# Patient Record
Sex: FEMALE | Race: WHITE | NOT HISPANIC OR LATINO | URBAN - METROPOLITAN AREA
[De-identification: names, ages, dates, MRNs, and addresses within clinical notes are randomized per-mention and may not be internally consistent; named-entity substitution may affect disease eponyms.]

---

## 2019-08-09 ENCOUNTER — EMERGENCY (EMERGENCY)
Facility: HOSPITAL | Age: 24
LOS: 1 days | Discharge: HOME | End: 2019-08-09
Attending: EMERGENCY MEDICINE | Admitting: EMERGENCY MEDICINE
Payer: COMMERCIAL

## 2019-08-09 VITALS
HEART RATE: 83 BPM | DIASTOLIC BLOOD PRESSURE: 89 MMHG | TEMPERATURE: 99 F | OXYGEN SATURATION: 98 % | RESPIRATION RATE: 18 BRPM | WEIGHT: 247.14 LBS | SYSTOLIC BLOOD PRESSURE: 129 MMHG | HEIGHT: 66 IN

## 2019-08-09 LAB
ANION GAP SERPL CALC-SCNC: 14 MMOL/L — SIGNIFICANT CHANGE UP (ref 5–17)
BASOPHILS # BLD AUTO: 0.02 K/UL — SIGNIFICANT CHANGE UP (ref 0–0.2)
BASOPHILS NFR BLD AUTO: 0.3 % — SIGNIFICANT CHANGE UP (ref 0–2)
BUN SERPL-MCNC: 9 MG/DL — SIGNIFICANT CHANGE UP (ref 7–23)
CALCIUM SERPL-MCNC: 9.7 MG/DL — SIGNIFICANT CHANGE UP (ref 8.4–10.5)
CHLORIDE SERPL-SCNC: 106 MMOL/L — SIGNIFICANT CHANGE UP (ref 96–108)
CO2 SERPL-SCNC: 23 MMOL/L — SIGNIFICANT CHANGE UP (ref 22–31)
CREAT SERPL-MCNC: 0.66 MG/DL — SIGNIFICANT CHANGE UP (ref 0.5–1.3)
EOSINOPHIL # BLD AUTO: 0.02 K/UL — SIGNIFICANT CHANGE UP (ref 0–0.5)
EOSINOPHIL NFR BLD AUTO: 0.3 % — SIGNIFICANT CHANGE UP (ref 0–6)
GLUCOSE SERPL-MCNC: 92 MG/DL — SIGNIFICANT CHANGE UP (ref 70–99)
HCT VFR BLD CALC: 41 % — SIGNIFICANT CHANGE UP (ref 34.5–45)
HGB BLD-MCNC: 13.1 G/DL — SIGNIFICANT CHANGE UP (ref 11.5–15.5)
IMM GRANULOCYTES NFR BLD AUTO: 0.1 % — SIGNIFICANT CHANGE UP (ref 0–1.5)
LYMPHOCYTES # BLD AUTO: 2.56 K/UL — SIGNIFICANT CHANGE UP (ref 1–3.3)
LYMPHOCYTES # BLD AUTO: 35.8 % — SIGNIFICANT CHANGE UP (ref 13–44)
MCHC RBC-ENTMCNC: 27.2 PG — SIGNIFICANT CHANGE UP (ref 27–34)
MCHC RBC-ENTMCNC: 32 GM/DL — SIGNIFICANT CHANGE UP (ref 32–36)
MCV RBC AUTO: 85.1 FL — SIGNIFICANT CHANGE UP (ref 80–100)
MONOCYTES # BLD AUTO: 0.54 K/UL — SIGNIFICANT CHANGE UP (ref 0–0.9)
MONOCYTES NFR BLD AUTO: 7.5 % — SIGNIFICANT CHANGE UP (ref 2–14)
NEUTROPHILS # BLD AUTO: 4.01 K/UL — SIGNIFICANT CHANGE UP (ref 1.8–7.4)
NEUTROPHILS NFR BLD AUTO: 56 % — SIGNIFICANT CHANGE UP (ref 43–77)
NRBC # BLD: 0 /100 WBCS — SIGNIFICANT CHANGE UP (ref 0–0)
PLATELET # BLD AUTO: 307 K/UL — SIGNIFICANT CHANGE UP (ref 150–400)
POTASSIUM SERPL-MCNC: 4 MMOL/L — SIGNIFICANT CHANGE UP (ref 3.5–5.3)
POTASSIUM SERPL-SCNC: 4 MMOL/L — SIGNIFICANT CHANGE UP (ref 3.5–5.3)
RBC # BLD: 4.82 M/UL — SIGNIFICANT CHANGE UP (ref 3.8–5.2)
RBC # FLD: 12.8 % — SIGNIFICANT CHANGE UP (ref 10.3–14.5)
SODIUM SERPL-SCNC: 143 MMOL/L — SIGNIFICANT CHANGE UP (ref 135–145)
WBC # BLD: 7.16 K/UL — SIGNIFICANT CHANGE UP (ref 3.8–10.5)
WBC # FLD AUTO: 7.16 K/UL — SIGNIFICANT CHANGE UP (ref 3.8–10.5)

## 2019-08-09 PROCEDURE — 99284 EMERGENCY DEPT VISIT MOD MDM: CPT

## 2019-08-09 NOTE — ED PROVIDER NOTE - OBJECTIVE STATEMENT
25 yo F, otherwise healthy, p/w right eye intermittent blurry vison with floaters and pressure around the eye that started today at 04:00 while she was working.  No known exposure to FB.  Pt went to sleep after and woke up later today with continued symptoms. Went to Urgent Care and was sent to [facility] ED for Opthalmology consult. No facial droop, slurred speech, paresthesias, HA, focal weakness or confusion noted. No fevers, chills, trauma. No other complaints.

## 2019-08-09 NOTE — ED ADULT NURSE REASSESSMENT NOTE - NS ED NURSE REASSESS COMMENT FT1
pt visual acuity with corrective lenses as follows:    both eyes: 20/10  right eye: 20/13  left eye: 20/10

## 2019-08-09 NOTE — ED PROVIDER NOTE - EYES, MLM
right eye is mildly injected, no FB noted. pupils equal, round and reactive to light. Visual acuity as noted in RN chart.

## 2019-08-09 NOTE — ED ADULT TRIAGE NOTE - CHIEF COMPLAINT QUOTE
c/o right eye intermittent blurred vison with floaters and pain that started yesterday at 07:00. No facial droop, slurring or speech, weakness or confusion noted. Referred by Urgent Care for opthalmology consult.

## 2019-08-09 NOTE — ED ADULT NURSE NOTE - CHPI ED NUR SYMPTOMS NEG
no drainage/no eye lid swelling/no foreign body/no discharge/no purulent drainage/no itching/no photophobia

## 2019-08-09 NOTE — ED PROVIDER NOTE - CARE PROVIDER_API CALL
Dominic Davis)  Ophthalmology  [address], [facility] [address]  [place] [address]  Phone: [phone]  Fax: [phone]  Follow Up Time:

## 2019-08-09 NOTE — ED PROVIDER NOTE - CLINICAL SUMMARY MEDICAL DECISION MAKING FREE TEXT BOX
23 yo F, otherwise healthy, p/w right eye intermittent blurry vison with floaters and pressure around the eye that started today at 04:00 while she was working.  No known exposure to FB.  Pt went to sleep after and woke up later today with continued symptoms. Went to Urgent Care and was sent to [facility] ED for Opthalmology consult. No facial droop, slurred speech, paresthesias, HA, focal weakness or confusion noted. No fevers, chills, trauma. Labs WNL. Ophthalmology consulted for concern for retinal detachment and will see pt in ED. Case endorsed to night team pending Ophtho consult.

## 2019-08-09 NOTE — ED ADULT NURSE REASSESSMENT NOTE - NS ED NURSE REASSESS COMMENT FT1
pt resting on stretcher appears comfortable awaiting opthalmology in NAD. informed and agreeable to plan

## 2019-08-09 NOTE — ED ADULT NURSE NOTE - OBJECTIVE STATEMENT
Patient Patient alert and oriented x 3 came c/o sudden rt eye blurry vision , ,loss of vision , pressure and heaviness on rt eye since last night and intermitent all day , denies any headache , dizziness , nausea . [facility] .

## 2019-08-09 NOTE — ED PROVIDER NOTE - NSFOLLOWUPINSTRUCTIONS_ED_ALL_ED_FT
I have discussed the discharge plan with the patient. The patient agrees with the plan, as discussed.  The patient understands Emergency Department diagnosis is a preliminary diagnosis often based on limited information and that the patient must adhere to the follow-up plan as discussed.  The patient understands that if the symptoms worsen or if prescribed medications do not have the desired/planned effect that the patient may return to the Emergency Department at any time for further evaluation and treatment.      Visual Disturbances  Image   A visual disturbance is any problem that interferes with your normal vision. This can affect one eye or both eyes. Some types of visual disturbances come and go without treatment and do not cause a permanent problem. Other visual disturbances may be a sign of a medical emergency.    Visual disturbances include:  Blurred vision.  Being unable to see certain colors.  Being sensitive to light.  Double vision.  Partial vision loss (visual field deficit).  Being unaware of objects on one side of the body (visual spatial inattention).  Rhythmic eye movements that you cannot control (nystagmus).  Short-term or long-term blindness.  Seeing:  Floating spots or lines (floaters).  Flashing or shimmering lights.  Zigzagging lines or stars.  The floor as tilted (visual midline shift).  Things that are not really there (hallucinations).  Causes of visual disturbances include:  Eye infection.  The thin membrane at the back of the eye  from the eyeball (retinal detachment).  High blood pressure.  Migraine.  Glaucoma.  Ischemic stroke.  Cerebral aneurysm.  It is important to get your eyes checked by a health care provider or eye specialist (ophthalmologist or optometrist) as soon as possible to determine the cause of your visual disturbance.    Follow these instructions at home:  Take over-the-counter and prescription medicines only as told by your health care provider.  Do not use any products that contain nicotine or tobacco, such as cigarettes and e-cigarettes. If you need help quitting, ask your health care provider.  To lower your risk of the problems that can lead to visual disturbances:  Eat a balanced diet that includes fruits and vegetables, whole grains, lean meat, and low-fat dairy.  Maintain a healthy weight. Work with your health care provider to lose weight if you need to.  Exercise regularly. Ask your health care provider what activities are safe for you.  Do not drive if you have trouble seeing. Ask your health care provider for guidance about when it is and is not safe for you to drive.  Keep all follow-up visits as told by your health care provider. This is important.  Contact a health care provider if:  Your visual disturbance changes or becomes worse.  Get help right away if you:  Image   Have new visual disturbances.  Suddenly see flashing lights or floaters.  Suddenly have a dark area in your field of vision, especially in the lower part. This can lead to a loss of central vision.  Lose vision in one or both eyes.  Have any symptoms of a stroke. "BE FAST" is an easy way to remember the main warning signs of a stroke:  B - Balance. Signs are dizziness, sudden trouble walking, or loss of balance.  E - Eyes. Signs are trouble seeing or a sudden change in vision.  F - Face. Signs are sudden weakness or numbness of the face, or the face or eyelid drooping on one side.  A - Arms. Signs are weakness or numbness in an arm. This happens suddenly and usually on one side of the body.  S - Speech. Signs are sudden trouble speaking, slurred speech, or trouble understanding what people say.  T - Time. Time to call emergency services. Write down what time symptoms started.  Have other signs of a stroke, such as:  A sudden, severe headache with no known cause.  Nausea or vomiting.  Seizure.  These symptoms may represent a serious problem that is an emergency. Do not wait to see if the symptoms will go away. Get medical help right away. Call your local emergency services (911 in the [place] Do not drive yourself to the hospital.     Summary  A visual disturbance is any problem that interferes with your normal vision.  Some visual disturbances may be a sign of a medical emergency.  It is important to get your eyes checked by a health care provider or eye specialist to determine what kind of visual disturbance you have.  This information is not intended to replace advice given to you by your health care provider. Make sure you discuss any questions you have with your health care provider.

## 2019-08-09 NOTE — ED PROVIDER NOTE - PROGRESS NOTE DETAILS
Ophthalmology consulted and will see pt in ED. pt seen  and evaluated by ophthalmologist in the ER d/c home with meds recommended. pt instructed to f/u on Monday for further evaluation.

## 2019-08-10 VITALS
DIASTOLIC BLOOD PRESSURE: 92 MMHG | HEART RATE: 67 BPM | TEMPERATURE: 98 F | RESPIRATION RATE: 16 BRPM | SYSTOLIC BLOOD PRESSURE: 124 MMHG | OXYGEN SATURATION: 99 %

## 2019-08-10 PROCEDURE — 80048 BASIC METABOLIC PNL TOTAL CA: CPT

## 2019-08-10 PROCEDURE — 85025 COMPLETE CBC W/AUTO DIFF WBC: CPT

## 2019-08-10 PROCEDURE — 99284 EMERGENCY DEPT VISIT MOD MDM: CPT | Mod: 25

## 2019-08-10 PROCEDURE — 36415 COLL VENOUS BLD VENIPUNCTURE: CPT

## 2019-08-10 RX ORDER — BRIMONIDINE TARTRATE 2 MG/MG
1 SOLUTION/ DROPS OPHTHALMIC ONCE
Refills: 0 | Status: COMPLETED | OUTPATIENT
Start: 2019-08-10 | End: 2019-08-10

## 2019-08-10 RX ORDER — DORZOLAMIDE HYDROCHLORIDE TIMOLOL MALEATE 20; 5 MG/ML; MG/ML
1 SOLUTION/ DROPS OPHTHALMIC ONCE
Refills: 0 | Status: COMPLETED | OUTPATIENT
Start: 2019-08-10 | End: 2019-08-10

## 2019-08-10 RX ADMIN — DORZOLAMIDE HYDROCHLORIDE TIMOLOL MALEATE 1 DROP(S): 20; 5 SOLUTION/ DROPS OPHTHALMIC at 01:22

## 2019-08-10 RX ADMIN — BRIMONIDINE TARTRATE 1 DROP(S): 2 SOLUTION/ DROPS OPHTHALMIC at 01:27

## 2019-08-10 NOTE — PROGRESS NOTE ADULT - ASSESSMENT
ocular hypertension and blurry vision OD  starting dorzolamide, timolol, alphagan in ED, multiple rounds  at home take 1 gtt each TID OD  follow up urgently this week with glaucoma expert, given my and 2 specialist's info locally    no FHx glaucoma  father blind near end of life, secondary to systemic malignancy

## 2019-08-10 NOTE — PROGRESS NOTE ADULT - SUBJECTIVE AND OBJECTIVE BOX
right eye loss of vision, variable and new onset, associated with pain near eye    vision  OD and OS 20/20 at near without correction  IOP OD 36, 36, OS 14, 14 Tonopen  PERRL  EOMI    LLL: unremarkable, no malposition OU  S/C: white and quiet  K: clear OU  AC: deep and quiet OU  I: round and regular OU  L: clear OU    fundus exam:  vit clear OU  retina flat OU  macula flat, good reflex OU  vessels normal caliber OU  ON 0.4 CD ratio OU, no edema, no heme

## 2019-08-14 DIAGNOSIS — H53.8 OTHER VISUAL DISTURBANCES: ICD-10-CM

## 2020-02-04 ENCOUNTER — EMERGENCY (EMERGENCY)
Facility: HOSPITAL | Age: 25
LOS: 1 days | Discharge: HOME | End: 2020-02-04
Attending: EMERGENCY MEDICINE | Admitting: EMERGENCY MEDICINE
Payer: COMMERCIAL

## 2020-02-04 VITALS
TEMPERATURE: 98 F | HEIGHT: 66 IN | OXYGEN SATURATION: 98 % | SYSTOLIC BLOOD PRESSURE: 132 MMHG | HEART RATE: 74 BPM | DIASTOLIC BLOOD PRESSURE: 88 MMHG | RESPIRATION RATE: 18 BRPM | WEIGHT: 240.08 LBS

## 2020-02-04 DIAGNOSIS — Z23 ENCOUNTER FOR IMMUNIZATION: ICD-10-CM

## 2020-02-04 DIAGNOSIS — R68.84 JAW PAIN: ICD-10-CM

## 2020-02-04 DIAGNOSIS — R07.89 OTHER CHEST PAIN: ICD-10-CM

## 2020-02-04 PROCEDURE — 99284 EMERGENCY DEPT VISIT MOD MDM: CPT

## 2020-02-04 RX ORDER — CYCLOBENZAPRINE HYDROCHLORIDE 10 MG/1
10 TABLET, FILM COATED ORAL ONCE
Refills: 0 | Status: COMPLETED | OUTPATIENT
Start: 2020-02-04 | End: 2020-02-04

## 2020-02-04 RX ORDER — TETANUS TOXOID, REDUCED DIPHTHERIA TOXOID AND ACELLULAR PERTUSSIS VACCINE, ADSORBED 5; 2.5; 8; 8; 2.5 [IU]/.5ML; [IU]/.5ML; UG/.5ML; UG/.5ML; UG/.5ML
0.5 SUSPENSION INTRAMUSCULAR ONCE
Refills: 0 | Status: COMPLETED | OUTPATIENT
Start: 2020-02-04 | End: 2020-02-04

## 2020-02-04 RX ORDER — IBUPROFEN 200 MG
600 TABLET ORAL ONCE
Refills: 0 | Status: COMPLETED | OUTPATIENT
Start: 2020-02-04 | End: 2020-02-04

## 2020-02-04 RX ORDER — TETANUS IMMUNE GLOBULIN 250 UNIT
250 SYRINGE (EA) INTRAMUSCULAR ONCE
Refills: 0 | Status: COMPLETED | OUTPATIENT
Start: 2020-02-04 | End: 2020-02-04

## 2020-02-04 RX ORDER — METHOCARBAMOL 500 MG/1
1500 TABLET, FILM COATED ORAL ONCE
Refills: 0 | Status: COMPLETED | OUTPATIENT
Start: 2020-02-04 | End: 2020-02-04

## 2020-02-04 RX ORDER — FAMOTIDINE 10 MG/ML
1 INJECTION INTRAVENOUS
Qty: 30 | Refills: 0
Start: 2020-02-04

## 2020-02-04 RX ORDER — METHOCARBAMOL 500 MG/1
2 TABLET, FILM COATED ORAL
Qty: 60 | Refills: 0
Start: 2020-02-04

## 2020-02-04 RX ADMIN — METHOCARBAMOL 1500 MILLIGRAM(S): 500 TABLET, FILM COATED ORAL at 23:09

## 2020-02-04 NOTE — ED PROVIDER NOTE - NSFOLLOWUPINSTRUCTIONS_ED_ALL_ED_FT
Temporomandibular Joint Syndrome     Temporomandibular joint syndrome (TMJ syndrome) is a condition that causes pain in the temporomandibular joints. These joints are located near your ears and allow your jaw to open and close. For people with TMJ syndrome, chewing, biting, or other movements of the jaw can be difficult or painful.  TMJ syndrome is often mild and goes away within a few weeks. However, sometimes the condition becomes a long-term (chronic) problem.  What are the causes?  This condition may be caused by:  Grinding your teeth or clenching your jaw. Some people do this when they are under stress.Arthritis.Injury to the jaw.Head or neck injury.Teeth or dentures that are not aligned well.In some cases, the cause of TMJ syndrome may not be known.  What are the signs or symptoms?  The most common symptom of this condition is an aching pain on the side of the head in the area of the TMJ. Other symptoms may include:  Pain when moving your jaw, such as when chewing or biting.Being unable to open your jaw all the way.Making a clicking sound when you open your mouth.Headache.Earache.Neck or shoulder pain.How is this diagnosed?  This condition may be diagnosed based on:  Your symptoms and medical history.A physical exam. Your health care provider may check the range of motion of your jaw.Imaging tests, such as X-rays or an MRI.You may also need to see your dentist, who will determine if your teeth and jaw are lined up correctly.  How is this treated?  TMJ syndrome often goes away on its own. If treatment is needed, the options may include:  Eating soft foods and applying ice or heat.Medicines to relieve pain or inflammation.Medicines or massage to relax the muscles.A splint, bite plate, or mouthpiece to prevent teeth grinding or jaw clenching.Relaxation techniques or counseling to help reduce stress.A therapy for pain in which an electrical current is applied to the nerves through the skin (transcutaneous electrical nerve stimulation).Acupuncture. This is sometimes helpful to relieve pain.Jaw surgery. This is rarely needed.Follow these instructions at home:     Eating and drinking     Eat a soft diet if you are having trouble chewing.Avoid foods that require a lot of chewing. Do not chew gum.General instructions     Take over-the-counter and prescription medicines only as told by your health care provider.If directed, put ice on the painful area.  Put ice in a plastic bag.Place a towel between your skin and the bag.Leave the ice on for 20 minutes, 2–3 times a day.Apply a warm, wet cloth (warm compress) to the painful area as directed.Massage your jaw area and do any jaw stretching exercises as told by your health care provider.If you were given a splint, bite plate, or mouthpiece, wear it as told by your health care provider.Keep all follow-up visits as told by your health care provider. This is important.Contact a health care provider if:  You are having trouble eating.You have new or worsening symptoms.Get help right away if:  Your jaw locks open or closed.Summary  Temporomandibular joint syndrome (TMJ syndrome) is a condition that causes pain in the temporomandibular joints. These joints are located near your ears and allow your jaw to open and close.TMJ syndrome is often mild and goes away within a few weeks. However, sometimes the condition becomes a long-term (chronic) problem.Symptoms include an aching pain on the side of the head in the area of the TMJ, pain when chewing or biting, and being unable to open your jaw all the way. You may also make a clicking sound when you open your mouth.TMJ syndrome often goes away on its own. If treatment is needed, it may include medicines to relieve pain, reduce inflammation, or relax the muscles. A splint, bite plate, or mouthpiece may also be used to prevent teeth grinding or jaw clenching.This information is not intended to replace advice given to you by your health care provider. Make sure you discuss any questions you have with your health care provider.    Jaw Range of Motion Exercises  Jaw range of motion exercises are exercises that help your jaw move better. Exercises that help you have good posture (postural exercises) also help relieve jaw discomfort. These are often done along with range of motion exercises. These exercises can help prevent or improve:  Difficulty opening your mouth.Pain in your jaw while it is open or closed.Temporomandibular joint (TMJ) pain.Headache caused by jaw tension.Take other actions to prevent or relieve jaw pain, such as:  Avoiding things that cause or increase jaw pain. This may include:  Chewing gum or eating hard foods.Clenching your jaw or teeth, grinding your teeth, or keeping tension in your jaw muscles.Opening your mouth wide, such as for a big yawn.Leaning on your jaw, such as resting your jaw in your hand while leaning on a desk.Putting ice on your jaw.  Put ice in a plastic bag.Place a towel between your skin and the bag.Leave the ice on for 10–15 minutes, 2–3 times a day.Only do jaw exercises that your health care provider approves of. Only move your jaw as far as it can comfortably go in each direction. Do not move your jaw into positions that cause pain.  Range of motion exercises  Repeat each of these exercises 8 times, 1–2 times a day, or as told by your health care provider.  Exercise A: Forward protrusion     Push your jaw forward. Hold this position for 1–2 seconds.  Allow your jaw to return to its normal position and rest it there for 1–2 seconds.  Exercise B: Controlled opening     Stand or sit in front of a mirror. Place your tongue on the roof of your mouth, just behind your top teeth.  Keeping your tongue on the roof of your mouth, slowly open and close your mouth.  While you open and close your mouth, watch your jaw in the mirror. Try to keep your jaw from moving to one side or the other.  Exercise C: Right and left motion     Move your jaw right. Hold this position for 1–2 seconds. Allow your jaw to return to its normal position, and rest it there for 1–2 seconds.  Move your jaw left. Hold this position for 1–2 seconds. Allow your jaw to return to its normal position, and rest it there for 1–2 seconds.  Postural exercises  Exercise A: Chin tucks     You can do this exercise sitting, standing, or lying down.  Move your head straight back, keeping your head level. You can guide the movement by placing your fingers on your chin to push your jaw back in an even motion. You should be able to feel a double chin form at the end of the motion.  Hold this position for 5 seconds. Repeat 10–15 times.  Exercise B: Shoulder blade squeeze     Sit or stand.  Bend your elbows to about 90 degrees, which is the shape of a capital letter "L." Keep your upper arms by your body.  Squeeze your shoulder blades down and back, as though you were trying to touch your elbows behind you. Do not shrug your shoulders or move your head.  Hold this position for 5 seconds. Repeat 10–15 times.  Exercise C: Chest stretch     Stand facing a corner.  Put both of your hands and your forearms on the wall, with your arms wide apart.  Make sure your arms are at a 90-degree angle to your body. This means that you should hold your arms straight out from your body, level with the floor.  Step in toward the corner. Do not lean in.  Hold this position for 30 seconds. Repeat 3 times.  Contact a health care provider if you have:  Jaw pain that is new or gets worse.Clicking or popping sounds while doing the exercises.Get help right away if:  Your jaw is stuck in one place and you cannot move it.You cannot open or close your mouth.This information is not intended to replace advice given to you by your health care provider. Make sure you discuss any questions you have with your health care provider.

## 2020-02-04 NOTE — ED ADULT TRIAGE NOTE - CHIEF COMPLAINT QUOTE
Presents to ED from [facility] for left jaw pain radiating to left ear, and cat bite which occurred earlier today.  Unsure of present tetanus status.

## 2020-02-04 NOTE — ED PROVIDER NOTE - PATIENT PORTAL LINK FT
You can access the FollowMyHealth Patient Portal offered by [facility] by registering at the following website: [email or web address]. By joining [facility]’s FollowMyHealth portal, you will also be able to view your health information using other applications (apps) compatible with our system.

## 2020-02-04 NOTE — ED PROVIDER NOTE - CHPI ED SYMPTOMS POS
lockjaw, L jaw pain, jaw pain w/ speaking, L sided CP, L arm pain L jaw pain, jaw pain w/ speaking, L sided CP, L arm pain

## 2020-02-04 NOTE — ED PROVIDER NOTE - PHYSICAL EXAMINATION
CONSTITUTIONAL: Well appearing, well nourished, awake, alert and in no apparent distress.  HEENT: Head is atraumatic. Eyes clear bilaterally, normal EOMI. Airway patent.  CARDIAC: Normal rate, regular rhythm.  Heart sounds S1, S2.   RESPIRATORY: Breath sounds clear and equal bilaterally. no tachypnea, respiratory distress.   GASTROINTESTINAL: Abdomen soft, non-tender, no guarding, distension.  MUSCULOSKELETAL: Spine appears normal, no midline spinal tenderness, range of motion is not limited, no muscle or joint tenderness. no bony tenderness. no JVD, peripheral edema.   NEUROLOGICAL: Alert and oriented, no focal deficits, no motor or sensory deficits.  SKIN: Superficial 3mm puncture wound on L hand 1st digit. Not deep. No active bleeding.   PSYCHIATRIC: Alert and oriented to person, place, time/situation. normal mood and affect. no apparent risk to self or others. CONSTITUTIONAL: Well appearing, well nourished, awake, alert and in no apparent distress.  HEENT: Head is atraumatic. Eyes clear bilaterally, normal EOMI. Airway patent.  CARDIAC: Normal rate, regular rhythm.  Heart sounds S1, S2.   RESPIRATORY: Breath sounds clear and equal bilaterally. no tachypnea, respiratory distress.   GASTROINTESTINAL: Abdomen soft, non-tender, no guarding, distension.  MUSCULOSKELETAL: Spine appears normal, no midline spinal tenderness, range of motion is not limited, no muscle or joint tenderness. no bony tenderness. tenderness over masseter muscle on L .  NEUROLOGICAL: Alert and oriented, no focal deficits, no motor or sensory deficits.  SKIN: Superficial 3mm puncture wound on L hand 1st digit. Not deep. No active bleeding.   PSYCHIATRIC: Alert and oriented to person, place, time/situation. normal mood and affect. no apparent risk to self or others.

## 2020-02-04 NOTE — ED ADULT NURSE NOTE - OBJECTIVE STATEMENT
PT wit pmh of glaucoma presents s/p cat bite with hx of lock jaw this afternoon, currently complaining of left jaw pain, left chest pain, left arm pain. Pt reports she was bitten by her own cat who it UTD on vaccines and healthy. Pt reports she was able to push through the stiffness and can now open and close her jaw but ellicits tenderness to the left tmj.

## 2020-02-04 NOTE — ED PROVIDER NOTE - OBJECTIVE STATEMENT
23 y/o F PMHx ear infections and glaucoma ( unknown cause. treated at [facility] 6 M ago) presents to the ED with c/o her jaw locking up while eating 3-4 H ago. Pt reports that this morning she was bitten on her distal R 1st finger by her cat who is UTD w/ vaccinations ( though it is in contact w/ stray cats). She also reports that she has gotten bitten by cats before w/ no symptoms. Pt reports L jaw pain, and anxiety when she was unable to open up her jaw for a few moments. When she was able to open her mouth, her jaw hurt when speaking. 1 H after this episode of lock-jaw she report some L sided chest tightness that radiated down her L arm. Pt went to [facility] and they were concerned for tetanus. She still reports some constant L jaw pain that has improved since her initial lockjaw. Pt denies Hx of these symptoms before, current CP, allergies, prior surgeries. She took Ibuprofen s/p lockjaw. 23 y/o F PMHx ear infections and glaucoma  presents to the ED with c/o her jaw stiffening up while eating 3-4 H ago. Pt reports that this morning she was bitten on her distal R 1st finger by her cat who is UTD w/ vaccinations. She also reports that she has gotten bitten by cats before w/ no symptoms. Pt reports L jaw pain, and anxiety when she was unable to open up her jaw for a few moments. When she was able to open her mouth, her jaw hurt when speaking. 1 H after this episode of L jaw pain and pain w opening. she report some L sided chest tightness that radiated down her L arm. she thought this was secondary to anxiety.  She still reports some constant L jaw pain that has improved after when it initially started- which was all of a sudden after eating. stated ibuprofen did help after she took it. Pt denies Hx of these symptoms before, current CP, allergies, prior surgeries.

## 2020-02-04 NOTE — ED PROVIDER NOTE - CLINICAL SUMMARY MEDICAL DECISION MAKING FREE TEXT BOX
Pt w concern for L jaw stiffness, now mostly resolved and episode of chest pain after. well appearing, tender over masseter muscle. 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 b/l cerumen impaction noted also- advised otc hydrogen peroxide. jaw pain less likely acute tetanus reaction from cat bite- cat vaccinated and normal incubation time a few days. however updated pt's tetanus vaccine as could not remember when last and gave toxoid as pt w superficial puncture wound. EKG w no acute ischemia. sx- jaw pain improved w nsaids, muscle relaxers, will dc w same on dc. to return for any worsening sx.

## 2020-02-04 NOTE — ED PROVIDER NOTE - NSFOLLOWUPCLINICS_GEN_ALL_ED_FT
[facility]  Family Medicine  [facility]. [address]  [place], [place] [facility]  Phone: [phone]  Fax:   Follow Up Time:

## 2020-02-04 NOTE — ED ADULT NURSE NOTE - CHIEF COMPLAINT QUOTE
Presents to ED from [facility] for left jaw pain radiating to left ear, and cat bite which occurred earlier today.  Unsure of present tetanus status.

## 2020-02-05 PROCEDURE — 90715 TDAP VACCINE 7 YRS/> IM: CPT

## 2020-02-05 PROCEDURE — 99284 EMERGENCY DEPT VISIT MOD MDM: CPT | Mod: 25

## 2020-02-05 PROCEDURE — 96372 THER/PROPH/DIAG INJ SC/IM: CPT

## 2020-02-05 PROCEDURE — 90471 IMMUNIZATION ADMIN: CPT

## 2020-02-05 RX ADMIN — TETANUS TOXOID, REDUCED DIPHTHERIA TOXOID AND ACELLULAR PERTUSSIS VACCINE, ADSORBED 0.5 MILLILITER(S): 5; 2.5; 8; 8; 2.5 SUSPENSION INTRAMUSCULAR at 00:00

## 2020-02-05 RX ADMIN — Medication 250 UNIT(S): at 00:01

## 2025-05-16 PROBLEM — Z00.00 ENCOUNTER FOR PREVENTIVE HEALTH EXAMINATION: Status: ACTIVE | Noted: 2025-05-16

## 2025-05-21 DIAGNOSIS — Z80.3 FAMILY HISTORY OF MALIGNANT NEOPLASM OF BREAST: ICD-10-CM

## 2025-05-21 DIAGNOSIS — Z72.0 TOBACCO USE: ICD-10-CM

## 2025-05-22 PROBLEM — Z86.39 HISTORY OF DIABETES MELLITUS: Status: RESOLVED | Noted: 2025-05-22 | Resolved: 2025-05-22

## 2025-05-22 PROBLEM — F64.0 GENDER DYSPHORIA IN ADULT: Status: ACTIVE | Noted: 2025-05-22

## 2025-05-22 PROBLEM — Z86.69 HISTORY OF GLAUCOMA: Status: RESOLVED | Noted: 2025-05-22 | Resolved: 2025-05-22

## 2025-05-22 RX ORDER — EMPAGLIFLOZIN 25 MG/1
TABLET, FILM COATED ORAL
Refills: 0 | Status: ACTIVE | COMMUNITY

## 2025-05-29 ENCOUNTER — APPOINTMENT (OUTPATIENT)
Dept: PLASTIC SURGERY | Facility: CLINIC | Age: 30
End: 2025-05-29
Payer: COMMERCIAL

## 2025-05-29 VITALS
BODY MASS INDEX: 43.39 KG/M2 | HEIGHT: 66 IN | HEART RATE: 111 BPM | WEIGHT: 270 LBS | SYSTOLIC BLOOD PRESSURE: 139 MMHG | DIASTOLIC BLOOD PRESSURE: 91 MMHG | OXYGEN SATURATION: 97 %

## 2025-05-29 DIAGNOSIS — Z86.69 PERSONAL HISTORY OF OTHER DISEASES OF THE NERVOUS SYSTEM AND SENSE ORGANS: ICD-10-CM

## 2025-05-29 DIAGNOSIS — Z86.39 PERSONAL HISTORY OF OTHER ENDOCRINE, NUTRITIONAL AND METABOLIC DISEASE: ICD-10-CM

## 2025-05-29 DIAGNOSIS — F64.0 TRANSSEXUALISM: ICD-10-CM

## 2025-05-29 PROCEDURE — 99205 OFFICE O/P NEW HI 60 MIN: CPT

## 2025-05-29 PROCEDURE — 99204 OFFICE O/P NEW MOD 45 MIN: CPT

## 2025-07-17 ENCOUNTER — TRANSCRIPTION ENCOUNTER (OUTPATIENT)
Age: 30
End: 2025-07-17

## 2025-09-22 PROBLEM — G89.18 ACUTE POST-OPERATIVE PAIN: Status: ACTIVE | Noted: 2025-09-22
